# Patient Record
Sex: FEMALE | Race: WHITE
[De-identification: names, ages, dates, MRNs, and addresses within clinical notes are randomized per-mention and may not be internally consistent; named-entity substitution may affect disease eponyms.]

---

## 2017-06-16 ENCOUNTER — HOSPITAL ENCOUNTER (EMERGENCY)
Dept: HOSPITAL 31 - C.ER | Age: 27
Discharge: HOME | End: 2017-06-16
Payer: MEDICAID

## 2017-06-16 VITALS
HEART RATE: 87 BPM | OXYGEN SATURATION: 97 % | DIASTOLIC BLOOD PRESSURE: 76 MMHG | TEMPERATURE: 98.1 F | SYSTOLIC BLOOD PRESSURE: 132 MMHG

## 2017-06-16 VITALS — BODY MASS INDEX: 41.3 KG/M2

## 2017-06-16 VITALS — RESPIRATION RATE: 18 BRPM

## 2017-06-16 DIAGNOSIS — R10.2: Primary | ICD-10-CM

## 2017-06-16 LAB
ALBUMIN/GLOB SERPL: 1.2 {RATIO} (ref 1–2.1)
ALP SERPL-CCNC: 61 U/L (ref 38–126)
ALT SERPL-CCNC: 34 U/L (ref 9–52)
AST SERPL-CCNC: 27 U/L (ref 14–36)
BASOPHILS # BLD AUTO: 0 K/UL (ref 0–0.2)
BASOPHILS NFR BLD: 0.5 % (ref 0–2)
BILIRUB SERPL-MCNC: 0.9 MG/DL (ref 0.2–1.3)
BILIRUB UR-MCNC: NEGATIVE MG/DL
BUN SERPL-MCNC: 15 MG/DL (ref 7–17)
CALCIUM SERPL-MCNC: 8.9 MG/DL (ref 8.6–10.4)
CHLORIDE SERPL-SCNC: 102 MMOL/L (ref 98–107)
CO2 SERPL-SCNC: 26 MMOL/L (ref 22–30)
EOSINOPHIL # BLD AUTO: 0 K/UL (ref 0–0.7)
EOSINOPHIL NFR BLD: 0.4 % (ref 0–4)
ERYTHROCYTE [DISTWIDTH] IN BLOOD BY AUTOMATED COUNT: 14 % (ref 11.5–14.5)
GLOBULIN SER-MCNC: 3.8 GM/DL (ref 2.2–3.9)
GLUCOSE SERPL-MCNC: 118 MG/DL (ref 65–105)
GLUCOSE UR STRIP-MCNC: NORMAL MG/DL
HCT VFR BLD CALC: 39.4 % (ref 34–47)
KETONES UR STRIP-MCNC: NEGATIVE MG/DL
LEUKOCYTE ESTERASE UR-ACNC: (no result) LEU/UL
LYMPHOCYTES # BLD AUTO: 2.1 K/UL (ref 1–4.3)
LYMPHOCYTES NFR BLD AUTO: 20.9 % (ref 20–40)
MCH RBC QN AUTO: 28.3 PG (ref 27–31)
MCHC RBC AUTO-ENTMCNC: 32.5 G/DL (ref 33–37)
MCV RBC AUTO: 87 FL (ref 81–99)
MONOCYTES # BLD: 0.4 K/UL (ref 0–0.8)
MONOCYTES NFR BLD: 3.9 % (ref 0–10)
NRBC BLD AUTO-RTO: 0 % (ref 0–2)
PH UR STRIP: 6 [PH] (ref 5–8)
PLATELET # BLD: 313 K/UL (ref 130–400)
PMV BLD AUTO: 8.6 FL (ref 7.2–11.7)
POTASSIUM SERPL-SCNC: 3.8 MMOL/L (ref 3.6–5.2)
PROT SERPL-MCNC: 8.4 G/DL (ref 6.3–8.3)
PROT UR STRIP-MCNC: NEGATIVE MG/DL
RBC # UR STRIP: NEGATIVE /UL
RBC #/AREA URNS HPF: 3 /HPF (ref 0–3)
SODIUM SERPL-SCNC: 139 MMOL/L (ref 132–148)
SP GR UR STRIP: 1.03 (ref 1–1.03)
UROBILINOGEN UR-MCNC: NORMAL MG/DL (ref 0.2–1)
WBC # BLD AUTO: 9.9 K/UL (ref 4.8–10.8)
WBC #/AREA URNS HPF: 2 /HPF (ref 0–5)

## 2017-06-16 PROCEDURE — 81001 URINALYSIS AUTO W/SCOPE: CPT

## 2017-06-16 PROCEDURE — 80053 COMPREHEN METABOLIC PANEL: CPT

## 2017-06-16 PROCEDURE — 85025 COMPLETE CBC W/AUTO DIFF WBC: CPT

## 2017-06-16 PROCEDURE — 83690 ASSAY OF LIPASE: CPT

## 2017-06-16 PROCEDURE — 76830 TRANSVAGINAL US NON-OB: CPT

## 2017-06-16 PROCEDURE — 87086 URINE CULTURE/COLONY COUNT: CPT

## 2017-06-16 PROCEDURE — 96360 HYDRATION IV INFUSION INIT: CPT

## 2017-06-16 PROCEDURE — 84703 CHORIONIC GONADOTROPIN ASSAY: CPT

## 2017-06-16 PROCEDURE — 99284 EMERGENCY DEPT VISIT MOD MDM: CPT

## 2017-06-16 NOTE — C.PDOC
History Of Present Illness


26-year-old female, PMHx includes Hypertension and possible PCOS, presents to 

the emergency department with complaints of B/L lower pelvic pain for the past 

several months, that is intermittent in nature, sharp and occurs for twenty 

minutes at a time, every two hours. Patient denies fevers, chills, shortness of 

breath, nausea/vomiting, diarrhea, constipation, vaginal bleeding, dysuria, or 

any other associated symptoms. Patient states she went to the ER for same 

complaint in the past; Patient had a negative urine and lab work, and she was 

instructed to f/u with OBGYN outpatient. Patient was seen by PMD, who also 

instructed her to see OBGYN. Patient denies nausea/vomiting, diarrhea, fevers, 

chills, chest pain, shortness of breath, or any other associated symptoms. No 

other complaints at this time.


Time Seen by Provider: 17 13:05


Chief Complaint (Nursing): Abdominal Pain


History Per: Patient


History/Exam Limitations: no limitations


Onset/Duration Of Symptoms: Days, Intermittent Episodes


Current Symptoms Are (Timing): Still Present


Severity: Moderate





Past Medical History


Reviewed: Historical Data, Nursing Documentation, Vital Signs


Vital Signs: 


 Last Vital Signs











Temp  98.7 F   17 12:37


 


Pulse  94 H  17 12:37


 


Resp  18   17 12:37


 


BP  138/84   17 12:37


 


Pulse Ox  98   17 13:52














- Medical History


PMH: HTN


   Denies: Sexually Transmitted Disease


Surgical History: 





- [facility] Procedures








FETAL MONITORING NOS (03/16/15)


LOW CERVICAL  (06/08/15)








Family History: States: No Known Family Hx





- Social History


Hx Tobacco Use: No


Hx Alcohol Use: Yes


Hx Substance Use: No





- Immunization History


Hx Tetanus Toxoid Vaccination: No


Hx Influenza Vaccination: Yes (May 2017)


Hx Pneumococcal Vaccination: No





Review Of Systems


Except As Marked, All Systems Reviewed And Found Negative.


Constitutional: Negative for: Fever, Chills


Gastrointestinal: Negative for: Nausea, Vomiting


Genitourinary: Positive for: Pelvic Pain.  Negative for: Dysuria, Frequency, 

Vaginal Discharge, Vaginal Bleeding


Musculoskeletal: Negative for: Back Pain


Skin: Negative for: Rash





Physical Exam





- Physical Exam


Additional Physical Exam Comments: 





Constitutional: No acute distress.


Head: Normocephalic.  Atraumatic.  


Eyes:  PERRL.


ENT:  Moist mucous membranes.


Neck:  Supple.


Cardiovascular:  Regular rate. Radial pulses 2+ bilaterally.


Chest: No tenderness.


Respiratory:  Clear to auscultation bilaterally.


GI:  Soft. Nontender. Obese


Back:  No CVA tenderness.


Musculoskeletal:  No tenderness or swelling of extremities.


Skin: No rashes.


Neurologic:  Alert, no focal deficit.





ED Course And Treatment





- Laboratory Results


Result Diagrams: 


 17 13:39





 17 13:39


O2 Sat by Pulse Oximetry: 98





Medical Decision Making


Medical Decision Making: 


Impression


25y/o F, comes in w/ low pelvic pain x several months.





Plan:


* Labs


* IVF


* Transvaginal US


* UA/HCG


* Reassess and Disposition





Accession No. : [id number]


Patient Name / ID : GENOVEVA FOURNIER  / [id number]


Exam Date : 2017 13:56:48 ( Approved )


Study Comment : 


Sex / Age : F  / 26Y





Creator : Tracey West MD


Dictator : Tracey West MD


 : 


Approver : Tracey West MD


Approver2 : 





Report Date : 2017 14:16:36


My Comment : 


********************************************************************************

***





HISTORY:


bilateral pelvic pain





COMPARISON:


None available.





TECHNIQUE:


Transvaginal pelvic ultrasound 





FINDINGS:





UTERUS:


Measures 9.8 x 4.0 x 5.3 cm. Anteverted. 





ENDOMETRIUM:


Measures 9 mm in diameter. 





CERVIX:


Cervix length measures approximately 2.9 cm. 





RIGHT OVARY:


Measures 3.9 x 3.2 x 3.7 cm. Blood flow is demonstrated. Numerous tiny 

follicles. 





LEFT OVARY:


Measures 3.8 x 2.6 x 2.8 cm. Blood flow is demonstrated. Numerous tiny 

follicles. 





FREE FLUID:


No significant free fluid noted.





OTHER FINDINGS:


None. 





IMPRESSION:


Numerous tiny bilateral ovarian follicles.  Correlate clinically.  Otherwise 

unremarkable study.





Patient in no acute distress. Discharge home, f/u OBGYN, return to ER for 

worsening pain, fever, vomiting, dysuria, dyspnea, or any other problem.





Disposition





- Disposition


Referrals: 


[facility] [email or web address] [facility] [Outside]


Disposition: HOME/ ROUTINE


Disposition Time: 14:21


Condition: STABLE


Additional Instructions: 


Transvaginal pelvic ultrasound 





FINDINGS:





UTERUS:


Measures 9.8 x 4.0 x 5.3 cm. Anteverted. 





ENDOMETRIUM:


Measures 9 mm in diameter. 





CERVIX:


Cervix length measures approximately 2.9 cm. 





RIGHT OVARY:


Measures 3.9 x 3.2 x 3.7 cm. Blood flow is demonstrated. Numerous tiny 

follicles. 





LEFT OVARY:


Measures 3.8 x 2.6 x 2.8 cm. Blood flow is demonstrated. Numerous tiny 

follicles. 





FREE FLUID:


No significant free fluid noted.





OTHER FINDINGS:


None. 





IMPRESSION:


Numerous tiny bilateral ovarian follicles.  Correlate clinically.  Otherwise 

unremarkable study.





Instructions:  Pelvic Pain in Women (ED)





- Clinical Impression


Clinical Impression: 


 Pelvic pain








- Scribe Statement


The provider has reviewed the documentation as recorded by the Elaine Alfaro





All medical record entries made by the Kyleiblobo were at my direction and 

personally dictated by me. I have reviewed the chart and agree that the record 

accurately reflects my personal performance of the history, physical exam, 

medical decision making, and the department course for this patient. I have 

also personally directed, reviewed, and agree with the discharge instructions 

and disposition.

## 2017-06-16 NOTE — US
HISTORY:

bilateral pelvic pain



COMPARISON:

None available.



TECHNIQUE:

Transvaginal pelvic ultrasound 



FINDINGS:



UTERUS:

Measures 9.8 x 4.0 x 5.3 cm. Anteverted. 



ENDOMETRIUM:

Measures 9 mm in diameter. 



CERVIX:

Cervix length measures approximately 2.9 cm. 



RIGHT OVARY:

Measures 3.9 x 3.2 x 3.7 cm. Blood flow is demonstrated. Numerous 

tiny follicles. 



LEFT OVARY:

Measures 3.8 x 2.6 x 2.8 cm. Blood flow is demonstrated. Numerous 

tiny follicles. 



FREE FLUID:

No significant free fluid noted.



OTHER FINDINGS:

None. 



IMPRESSION:

Numerous tiny bilateral ovarian follicles.  Correlate clinically.  

Otherwise unremarkable study.

## 2019-03-06 ENCOUNTER — HOSPITAL ENCOUNTER (EMERGENCY)
Dept: HOSPITAL 14 - H.ER | Age: 29
Discharge: HOME | End: 2019-03-06
Payer: MEDICAID

## 2019-03-06 VITALS
HEART RATE: 74 BPM | OXYGEN SATURATION: 98 % | DIASTOLIC BLOOD PRESSURE: 74 MMHG | TEMPERATURE: 98.3 F | SYSTOLIC BLOOD PRESSURE: 136 MMHG

## 2019-03-06 VITALS — RESPIRATION RATE: 18 BRPM

## 2019-03-06 VITALS — BODY MASS INDEX: 41.3 KG/M2

## 2019-03-06 DIAGNOSIS — O16.1: ICD-10-CM

## 2019-03-06 DIAGNOSIS — O23.41: Primary | ICD-10-CM

## 2019-03-06 DIAGNOSIS — Z3A.01: ICD-10-CM

## 2019-03-06 LAB
BACTERIA #/AREA URNS HPF: (no result) /[HPF]
BASOPHILS # BLD AUTO: 0 K/UL (ref 0–0.2)
BASOPHILS NFR BLD: 0.2 % (ref 0–2)
BILIRUB UR-MCNC: NEGATIVE MG/DL
BUN SERPL-MCNC: 10 MG/DL (ref 7–17)
CALCIUM SERPL-MCNC: 9.7 MG/DL (ref 8.4–10.2)
COLOR UR: YELLOW
EOSINOPHIL # BLD AUTO: 0 K/UL (ref 0–0.7)
EOSINOPHIL NFR BLD: 0.5 % (ref 0–4)
ERYTHROCYTE [DISTWIDTH] IN BLOOD BY AUTOMATED COUNT: 15.2 % (ref 11.5–14.5)
GFR NON-AFRICAN AMERICAN: > 60
GLUCOSE UR STRIP-MCNC: (no result) MG/DL
HGB BLD-MCNC: 12.7 G/DL (ref 12–16)
LEUKOCYTE ESTERASE UR-ACNC: (no result) LEU/UL
LYMPHOCYTES # BLD AUTO: 3.4 K/UL (ref 1–4.3)
LYMPHOCYTES NFR BLD AUTO: 34.9 % (ref 20–40)
MCH RBC QN AUTO: 27.5 PG (ref 27–31)
MCHC RBC AUTO-ENTMCNC: 32.6 G/DL (ref 33–37)
MCV RBC AUTO: 84.2 FL (ref 81–99)
MONOCYTES # BLD: 0.6 K/UL (ref 0–0.8)
MONOCYTES NFR BLD: 6.4 % (ref 0–10)
NEUTROPHILS # BLD: 5.7 K/UL (ref 1.8–7)
NEUTROPHILS NFR BLD AUTO: 58 % (ref 50–75)
NRBC BLD AUTO-RTO: 0.5 % (ref 0–0)
PH UR STRIP: 5 [PH] (ref 5–8)
PLATELET # BLD: 331 K/UL (ref 130–400)
PMV BLD AUTO: 8.1 FL (ref 7.2–11.7)
PROT UR STRIP-MCNC: NEGATIVE MG/DL
RBC # BLD AUTO: 4.61 MIL/UL (ref 3.8–5.2)
RBC # UR STRIP: (no result) /UL
SP GR UR STRIP: 1.02 (ref 1–1.03)
SQUAMOUS EPITHIAL: 24 /HPF (ref 0–5)
URINE CLARITY: (no result)
UROBILINOGEN UR-MCNC: (no result) MG/DL (ref 0.2–1)
WBC # BLD AUTO: 9.8 K/UL (ref 4.8–10.8)

## 2019-03-20 ENCOUNTER — HOSPITAL ENCOUNTER (EMERGENCY)
Dept: HOSPITAL 14 - H.ER | Age: 29
Discharge: HOME | End: 2019-03-20
Payer: MEDICAID

## 2019-03-20 VITALS — HEART RATE: 83 BPM | DIASTOLIC BLOOD PRESSURE: 73 MMHG | OXYGEN SATURATION: 99 % | SYSTOLIC BLOOD PRESSURE: 132 MMHG

## 2019-03-20 VITALS — BODY MASS INDEX: 41.3 KG/M2

## 2019-03-20 VITALS — TEMPERATURE: 99.4 F | RESPIRATION RATE: 18 BRPM

## 2019-03-20 DIAGNOSIS — O26.891: Primary | ICD-10-CM

## 2019-03-20 DIAGNOSIS — Z3A.09: ICD-10-CM

## 2019-03-20 DIAGNOSIS — O16.1: ICD-10-CM

## 2019-03-20 LAB
ALBUMIN SERPL-MCNC: 4.3 G/DL (ref 3.5–5)
ALBUMIN/GLOB SERPL: 1.1 {RATIO} (ref 1–2.1)
ALT SERPL-CCNC: 22 U/L (ref 9–52)
AST SERPL-CCNC: 19 U/L (ref 14–36)
BACTERIA #/AREA URNS HPF: (no result) /[HPF]
BASOPHILS # BLD AUTO: 0 K/UL (ref 0–0.2)
BASOPHILS NFR BLD: 0.4 % (ref 0–2)
BILIRUB UR-MCNC: NEGATIVE MG/DL
BUN SERPL-MCNC: 10 MG/DL (ref 7–17)
CALCIUM SERPL-MCNC: 9.6 MG/DL (ref 8.4–10.2)
COLOR UR: YELLOW
EOSINOPHIL # BLD AUTO: 0.1 K/UL (ref 0–0.7)
EOSINOPHIL NFR BLD: 0.5 % (ref 0–4)
ERYTHROCYTE [DISTWIDTH] IN BLOOD BY AUTOMATED COUNT: 15 % (ref 11.5–14.5)
GFR NON-AFRICAN AMERICAN: > 60
GLUCOSE UR STRIP-MCNC: (no result) MG/DL
HGB BLD-MCNC: 12 G/DL (ref 12–16)
LEUKOCYTE ESTERASE UR-ACNC: (no result) LEU/UL
LYMPHOCYTES # BLD AUTO: 3.3 K/UL (ref 1–4.3)
LYMPHOCYTES NFR BLD AUTO: 29.1 % (ref 20–40)
MCH RBC QN AUTO: 27.5 PG (ref 27–31)
MCHC RBC AUTO-ENTMCNC: 32.6 G/DL (ref 33–37)
MCV RBC AUTO: 84.4 FL (ref 81–99)
MONOCYTES # BLD: 0.6 K/UL (ref 0–0.8)
MONOCYTES NFR BLD: 5.1 % (ref 0–10)
NEUTROPHILS # BLD: 7.4 K/UL (ref 1.8–7)
NEUTROPHILS NFR BLD AUTO: 64.9 % (ref 50–75)
NRBC BLD AUTO-RTO: 0 % (ref 0–0)
PH UR STRIP: 5 [PH] (ref 5–8)
PLATELET # BLD: 343 K/UL (ref 130–400)
PMV BLD AUTO: 7.8 FL (ref 7.2–11.7)
PROT UR STRIP-MCNC: 30 MG/DL
RBC # BLD AUTO: 4.35 MIL/UL (ref 3.8–5.2)
RBC # UR STRIP: NEGATIVE /UL
SP GR UR STRIP: 1.03 (ref 1–1.03)
SQUAMOUS EPITHIAL: 10 /HPF (ref 0–5)
URINE CLARITY: (no result)
UROBILINOGEN UR-MCNC: 4 MG/DL (ref 0.2–1)
WBC # BLD AUTO: 11.4 K/UL (ref 4.8–10.8)

## 2019-04-18 ENCOUNTER — HOSPITAL ENCOUNTER (EMERGENCY)
Dept: HOSPITAL 14 - H.ER | Age: 29
Discharge: HOME | End: 2019-04-18
Payer: MEDICAID

## 2019-04-18 VITALS — BODY MASS INDEX: 41.3 KG/M2

## 2019-04-18 VITALS
SYSTOLIC BLOOD PRESSURE: 128 MMHG | OXYGEN SATURATION: 100 % | HEART RATE: 78 BPM | DIASTOLIC BLOOD PRESSURE: 78 MMHG | RESPIRATION RATE: 18 BRPM

## 2019-04-18 VITALS — TEMPERATURE: 98.1 F

## 2019-04-18 DIAGNOSIS — Z3A.14: ICD-10-CM

## 2019-04-18 DIAGNOSIS — O26.899: Primary | ICD-10-CM

## 2019-04-18 DIAGNOSIS — O16.2: ICD-10-CM

## 2019-04-18 LAB
BASOPHILS # BLD AUTO: 0 K/UL (ref 0–0.2)
BASOPHILS NFR BLD: 0.2 % (ref 0–2)
BILIRUB UR-MCNC: NEGATIVE MG/DL
BUN SERPL-MCNC: 6 MG/DL (ref 7–17)
CALCIUM SERPL-MCNC: 9.1 MG/DL (ref 8.4–10.2)
COLOR UR: YELLOW
EOSINOPHIL # BLD AUTO: 0.1 K/UL (ref 0–0.7)
EOSINOPHIL NFR BLD: 0.5 % (ref 0–4)
ERYTHROCYTE [DISTWIDTH] IN BLOOD BY AUTOMATED COUNT: 15.7 % (ref 11.5–14.5)
GFR NON-AFRICAN AMERICAN: > 60
GLUCOSE UR STRIP-MCNC: (no result) MG/DL
HGB BLD-MCNC: 11.8 G/DL (ref 12–16)
LEUKOCYTE ESTERASE UR-ACNC: (no result) LEU/UL
LYMPHOCYTES # BLD AUTO: 3.1 K/UL (ref 1–4.3)
LYMPHOCYTES NFR BLD AUTO: 29.4 % (ref 20–40)
MCH RBC QN AUTO: 28.2 PG (ref 27–31)
MCHC RBC AUTO-ENTMCNC: 33.2 G/DL (ref 33–37)
MCV RBC AUTO: 84.8 FL (ref 81–99)
MONOCYTES # BLD: 0.7 K/UL (ref 0–0.8)
MONOCYTES NFR BLD: 7 % (ref 0–10)
NEUTROPHILS # BLD: 6.7 K/UL (ref 1.8–7)
NEUTROPHILS NFR BLD AUTO: 62.9 % (ref 50–75)
NRBC BLD AUTO-RTO: 0.1 % (ref 0–0)
PH UR STRIP: 5 [PH] (ref 5–8)
PLATELET # BLD: 291 K/UL (ref 130–400)
PMV BLD AUTO: 8.3 FL (ref 7.2–11.7)
PROT UR STRIP-MCNC: 30 MG/DL
RBC # BLD AUTO: 4.19 MIL/UL (ref 3.8–5.2)
RBC # UR STRIP: NEGATIVE /UL
SP GR UR STRIP: 1.03 (ref 1–1.03)
SQUAMOUS EPITHIAL: 13 /HPF (ref 0–5)
URINE CLARITY: (no result)
UROBILINOGEN UR-MCNC: (no result) MG/DL (ref 0.2–1)
WBC # BLD AUTO: 10.7 K/UL (ref 4.8–10.8)

## 2019-04-18 PROCEDURE — 99284 EMERGENCY DEPT VISIT MOD MDM: CPT

## 2019-04-18 PROCEDURE — 76801 OB US < 14 WKS SINGLE FETUS: CPT

## 2019-04-18 PROCEDURE — 87591 N.GONORRHOEAE DNA AMP PROB: CPT

## 2019-04-18 PROCEDURE — 87086 URINE CULTURE/COLONY COUNT: CPT

## 2019-04-18 PROCEDURE — 81003 URINALYSIS AUTO W/O SCOPE: CPT

## 2019-04-18 PROCEDURE — 85025 COMPLETE CBC W/AUTO DIFF WBC: CPT

## 2019-04-18 PROCEDURE — 84702 CHORIONIC GONADOTROPIN TEST: CPT

## 2019-04-18 PROCEDURE — 80048 BASIC METABOLIC PNL TOTAL CA: CPT

## 2019-04-18 PROCEDURE — 87491 CHLMYD TRACH DNA AMP PROBE: CPT

## 2019-05-11 ENCOUNTER — HOSPITAL ENCOUNTER (EMERGENCY)
Dept: HOSPITAL 14 - H.EROB2 | Age: 29
Discharge: HOME | End: 2019-05-11
Payer: MEDICAID

## 2019-05-11 VITALS
DIASTOLIC BLOOD PRESSURE: 60 MMHG | RESPIRATION RATE: 18 BRPM | TEMPERATURE: 97.9 F | HEART RATE: 98 BPM | SYSTOLIC BLOOD PRESSURE: 161 MMHG | OXYGEN SATURATION: 100 %

## 2019-05-11 VITALS — BODY MASS INDEX: 41.3 KG/M2

## 2019-05-11 DIAGNOSIS — O26.92: Primary | ICD-10-CM

## 2019-05-11 DIAGNOSIS — O26.852: ICD-10-CM

## 2019-05-11 DIAGNOSIS — Z3A.16: ICD-10-CM

## 2019-05-11 DIAGNOSIS — R30.0: ICD-10-CM

## 2019-05-11 DIAGNOSIS — O23.42: ICD-10-CM

## 2019-05-11 LAB
BILIRUB UR-MCNC: NEGATIVE MG/DL
COLOR UR: YELLOW
GLUCOSE UR STRIP-MCNC: (no result) MG/DL
LEUKOCYTE ESTERASE UR-ACNC: (no result) LEU/UL
PH UR STRIP: 7 [PH] (ref 5–8)
PROT UR STRIP-MCNC: 30 MG/DL
RBC # UR STRIP: (no result) /UL
SP GR UR STRIP: 1.03 (ref 1–1.03)
SQUAMOUS EPITHIAL: 4 /HPF (ref 0–5)
URINE CLARITY: (no result)
UROBILINOGEN UR-MCNC: (no result) MG/DL (ref 0.2–1)

## 2019-05-13 ENCOUNTER — HOSPITAL ENCOUNTER (EMERGENCY)
Dept: HOSPITAL 14 - H.EROB2 | Age: 29
Discharge: HOME | End: 2019-05-13
Payer: MEDICAID

## 2019-05-13 VITALS — HEART RATE: 104 BPM | DIASTOLIC BLOOD PRESSURE: 68 MMHG | SYSTOLIC BLOOD PRESSURE: 122 MMHG

## 2019-05-13 VITALS — HEART RATE: 111 BPM | SYSTOLIC BLOOD PRESSURE: 138 MMHG | DIASTOLIC BLOOD PRESSURE: 73 MMHG | OXYGEN SATURATION: 98 %

## 2019-05-13 VITALS — BODY MASS INDEX: 41.3 KG/M2

## 2019-05-13 DIAGNOSIS — Z3A.16: ICD-10-CM

## 2019-05-13 DIAGNOSIS — O20.9: Primary | ICD-10-CM

## 2019-05-13 DIAGNOSIS — O26.92: ICD-10-CM

## 2019-05-13 DIAGNOSIS — R10.2: ICD-10-CM

## 2019-05-15 ENCOUNTER — HOSPITAL ENCOUNTER (EMERGENCY)
Dept: HOSPITAL 14 - H.EROB2 | Age: 29
Discharge: HOME | End: 2019-05-15
Payer: MEDICAID

## 2019-05-15 VITALS — SYSTOLIC BLOOD PRESSURE: 138 MMHG | DIASTOLIC BLOOD PRESSURE: 72 MMHG | HEART RATE: 101 BPM

## 2019-05-15 VITALS — BODY MASS INDEX: 39.9 KG/M2

## 2019-05-15 DIAGNOSIS — O20.9: Primary | ICD-10-CM

## 2019-05-15 DIAGNOSIS — Z3A.17: ICD-10-CM
